# Patient Record
Sex: MALE
[De-identification: names, ages, dates, MRNs, and addresses within clinical notes are randomized per-mention and may not be internally consistent; named-entity substitution may affect disease eponyms.]

---

## 2019-09-19 ENCOUNTER — HOSPITAL ENCOUNTER (OUTPATIENT)
Dept: HOSPITAL 92 - SCSMRI | Age: 24
Discharge: HOME | End: 2019-09-19
Attending: ORTHOPAEDIC SURGERY
Payer: COMMERCIAL

## 2019-09-19 DIAGNOSIS — S83.001A: ICD-10-CM

## 2019-09-19 DIAGNOSIS — S89.81XA: ICD-10-CM

## 2019-09-19 DIAGNOSIS — M23.91: Primary | ICD-10-CM

## 2019-09-20 NOTE — MRI
MRI RIGHT KNEE WITHOUT CONTRAST:

 

Date:  09/19/19

 

HISTORY:  

M23.91 internal derangement of right knee. 

 

COMPARISON:  

None. 

 

FINDINGS:

 

Medial Meniscus:  Vertical longitudinal tear mid portion anterior horn body and posterior horn, a buc
ket handle tear, with tissue flipped within the intercondylar notch. Undersurface extension along the
 posterior horn. Posterior medial meniscal capsular separation. 

 

Lateral Meniscus:  Radial oblique tear anterior horn through mid substance without displacement of ti
ssue. 

 

There is complete rupture of anterior cruciate ligament proximal fibers. Posterior cruciate ligament 
is intact. Grade I injury MCL. Posterior oblique ligament is intact. 

 

Low Grade I injury of the proximal fibers lateral collateral ligament. Partial tear popliteus tendon 
without disruption of the attachment. The popliteal fibular ligament is intact. 

 

Extensor Mechanism:  Quadriceps tendon, patella, and patellar tendon are intact. 

 

Cartilage:

Patellofemoral compartment:  Intact. 

Medial compartment:  Intact. 

Lateral compartment:  Intact. 

 

 

Posterior flexion zones are intact. 

 

Grade II partial tear medial patellofemoral ligament from the medial patella. 

 

Muscles:  Grade I injury of vastus medialis. Gastrocnemius muscle origins are intact. 

 

IMPRESSION: 

1.  Bucket hand vertical longitudinal tear throughout the medial meniscus with tissue flipped within 
the intercondylar notch. This involves approximately 50-60% of the meniscal volume. No chondral defec
t.

 

2. Radial oblique tear anterior horn lateral meniscus from the free edge through mid substance withou
t displacement of tissue.  

 

3.  Full thickness rupture proximal fibers anterior cruciate ligament. 

 

4.  Grade I injury MCL. 

 

5.  Grade I injury of the proximal fibers of lateral collateral ligament, as well as Grade I-II popli
teus tendon injury. The arcuate ligament and popliteal fibular ligaments are intact. 

 

6.  Partial tear MPFL from the medial patella without patellar subluxation. 

 

7.  Grade I strain of the soleus. 

 

 

POS: CET

## 2019-10-25 ENCOUNTER — HOSPITAL ENCOUNTER (OUTPATIENT)
Dept: HOSPITAL 92 - SDC | Age: 24
Setting detail: OBSERVATION
LOS: 1 days | Discharge: HOME | End: 2019-10-26
Attending: ORTHOPAEDIC SURGERY | Admitting: ORTHOPAEDIC SURGERY
Payer: COMMERCIAL

## 2019-10-25 VITALS — BODY MASS INDEX: 28 KG/M2

## 2019-10-25 DIAGNOSIS — S83.241A: ICD-10-CM
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
## 2019-10-25 DIAGNOSIS — S83.511A: Primary | ICD-10-CM

## 2019-10-25 DIAGNOSIS — G89.18: ICD-10-CM

## 2019-10-25 DIAGNOSIS — S83.281A: ICD-10-CM

## 2019-10-25 PROCEDURE — C1713 ANCHOR/SCREW BN/BN,TIS/BN: HCPCS

## 2019-10-25 PROCEDURE — 96366 THER/PROPH/DIAG IV INF ADDON: CPT

## 2019-10-25 PROCEDURE — 0MRN47Z REPLACEMENT OF RIGHT KNEE BURSA AND LIGAMENT WITH AUTOLOGOUS TISSUE SUBSTITUTE, PERCUTANEOUS ENDOSCOPIC APPROACH: ICD-10-PCS | Performed by: ORTHOPAEDIC SURGERY

## 2019-10-25 PROCEDURE — G0378 HOSPITAL OBSERVATION PER HR: HCPCS

## 2019-10-25 PROCEDURE — A4306 DRUG DELIVERY SYSTEM <=50 ML: HCPCS

## 2019-10-25 PROCEDURE — 3E0T3BZ INTRODUCTION OF ANESTHETIC AGENT INTO PERIPHERAL NERVES AND PLEXI, PERCUTANEOUS APPROACH: ICD-10-PCS | Performed by: ORTHOPAEDIC SURGERY

## 2019-10-25 PROCEDURE — 0SQC4ZZ REPAIR RIGHT KNEE JOINT, PERCUTANEOUS ENDOSCOPIC APPROACH: ICD-10-PCS | Performed by: ORTHOPAEDIC SURGERY

## 2019-10-25 PROCEDURE — 0SBC4ZZ EXCISION OF RIGHT KNEE JOINT, PERCUTANEOUS ENDOSCOPIC APPROACH: ICD-10-PCS | Performed by: ORTHOPAEDIC SURGERY

## 2019-10-25 PROCEDURE — 96365 THER/PROPH/DIAG IV INF INIT: CPT

## 2019-10-25 RX ADMIN — CEFAZOLIN SODIUM SCH MLS: 2 SOLUTION INTRAVENOUS at 22:28

## 2019-10-25 RX ADMIN — CEFAZOLIN SODIUM SCH MLS: 2 SOLUTION INTRAVENOUS at 15:39

## 2019-10-25 NOTE — OP
DATE OF PROCEDURE:  10/25/2019



PREOPERATIVE DIAGNOSIS:  Right knee anterior cruciate ligament tear with medial and

lateral meniscus tears. 



POSTOPERATIVE DIAGNOSIS:  Right knee anterior cruciate ligament tear with medial and

lateral meniscus tears. 



PROCEDURES PERFORMED:  

1. Right knee exam under anesthesia.

2. Right knee arthroscopy with arthroscopically-assisted anterior cruciate ligament

reconstruction using an autologous patellar tendon graft. 

3. Arthroscopic medial meniscus repair.

4. Arthroscopic partial lateral meniscectomy.



ASSISTANT:  Tripp Pham PA-C



ESTIMATED BLOOD LOSS:  Minimal.



ANESTHESIA:  He did have a general anesthetic.  He also had a preoperative block.



IMPLANTS:  On the femur, a 7 x 25 metal interference screw on the tibia bicortical

screw with a smooth washer used as a post.  We also implanted 2 Arthrex Meniscal

Cinch II devices. 



DISPOSITION:  He did go to recovery room in stable condition.



INDICATIONS:  This is a 24-year-old male, who has been having problems with his knee

for years.  Unfortunately, he was playing soccer, knee when out, came in complaining

of significant pain, swelling, and instability.  MRI scan found to have both medial

and lateral meniscus tears as well as a torn ACL and at this time, he opted for

surgery. 



DESCRIPTION OF PROCEDURE:  After all appropriate consent forms were explained and

signed, he was taken back to the operative room and at this time was given general

anesthetic.  Once the level of anesthesia was appropriate, a tourniquet was placed

onto the right thigh and the leg was then examined.  Positive Lachman's was noted.

He was stable with the varus and valgus stress.  At this time, the leg was placed in

an arthroscopic leg mckeon and was then prepped and draped in surgical fashion.

Limb was exsanguinated and the tourniquet taken up to 300 mmHg.  Midline incision

was done with a 10 blade down through the skin and the Bovie was used to coagulate

any brisk venous bleeding.  New blade was used to take the paratenon off the

underlying patellar tendon to expose the patellar tendon.  Central third patellar

tendon graft was harvested using the double 10 blade saw and osteotome.  This was

taken to the back table and made so that each plug was a size 10.  We loosely closed

our graft site using multiple interrupted Vicryl sutures.  Inferolateral portal was

then established.  Scope was placed into the knee joint.  Needle localization

technique was then used to make a medial working portal.  Diagnostic arthroscopy

commenced in the notch.  The ACL was found to be torn and essentially gone.  PCL was

intact.  Any remnant of the ACL was removed at this time.  Evaluation in the medial

compartment showed the patient to have a large medial meniscus tear going from

essentially the majority of the body all the way to the posterior horn, but the

posterior root was preserved.  This was at the red red zone and was felt deemable

for repair.  Shaver was introduced in between the meniscus and the capsule to

roughen up the area to promote bleeding and once this was done, we then turned our

attention to the lateral compartment.  A lateral meniscus tear was noted.  It was a

small flap tear in the body and a partial meniscectomy was performed using meniscal

biter and shaver.  This was a very minimal amount of meniscus maybe somewhere

between 5% and 8%.  At this time, we then went and evaluated medial and lateral

gutters.  They were clean.  The patellofemoral joint was also found to be in good

condition.  At this time, we then turned our attention to performing a notchplasty.

Once this was done, we then went to the medial compartment and performed a meniscal

repair.  We started the anterior portion of the tear using an outside-in technique

using the Micro SutureLasso to place a vertical type mattress suture into the front

portion of the tear.  This was not tied.  This was pulled tight and held to reduce

the meniscus.  We then used 2 Meniscal Cinch II devices in horizontal mattress

fashion in the posterior horn and the transition between the horn and the body.

Once these were tightened down, we had a nice repair.  We then turned our attention

to drilling our tunnels.  An over-the-top guide was placed through the medial portal

and a pin was placed up and out the anterolateral thigh.  We then reamed with a 10

mm reamer to a depth of 30.  All loose bony cartilaginous debris was removed from

the knee joint.  The tibial guide was then placed in the knee at 52.5 degrees.

Reamer was then used to ream our 10 tunnel and again all loose bony cartilaginous

debris was removed from the knee joint.  A red rasp and jaime were used to smooth out

the tunnels and remove any sharp edges.  We then went dry.  We then passed our pin

up and out the anterolateral thigh using this pull our passing suture into the knee

joint.  This was pulled down the tibial tunnel and used for graft up into the knee.

A 7 x 25 metal interference screw was then used to fixate our femoral side and in

full extension, we drilled, tapped, and placed a bicortical screw with a smooth

washer and tied our strings around this as opposed.  Posterior drawer was applied.

We then evaluated our knee directly, had full extension and full flexion.  No

impingement and a normal Lachman's exam.  At this time, we then bone-grafted our

patellar and tibial sites and ran our paratenon.  We then turned our attention to

the medial side, where we had started to tie our single set of sutures.  A small

incision was made between the stitches down through skin.  Bovie was used to

coagulate any brisk venous bleeding.  We then opened up the fascia and we were able

to pull our strings into the middle.  We then tied this to affectively finish our

meniscal repair.  Sutures were cut.  We then thoroughly irrigated and dried.  We

then closed our fascia followed by 2-0 Vicryl and staples for the small incision

anteriorly.  We used deep Vicryl to close our paratenon, followed by 2-0 Vicryl and

staples to close the skin.  Bulky sterile dressing was applied.  Tourniquet was let

down.  Toes pinked up nicely.  The patient was awakened and taken to the recovery

room in stable condition.  All counts were correct at the end of the case and he did

receive preoperative IV antibiotics. 







Job ID:  [id number]

## 2019-10-26 VITALS — SYSTOLIC BLOOD PRESSURE: 130 MMHG | DIASTOLIC BLOOD PRESSURE: 73 MMHG | TEMPERATURE: 98.6 F

## 2019-10-26 RX ADMIN — HYDROCODONE BITARTRATE AND ACETAMINOPHEN PRN TAB: 7.5; 325 TABLET ORAL at 09:09

## 2019-10-26 RX ADMIN — HYDROCODONE BITARTRATE AND ACETAMINOPHEN PRN TAB: 7.5; 325 TABLET ORAL at 13:37
